# Patient Record
Sex: MALE | Race: WHITE | NOT HISPANIC OR LATINO | Employment: FULL TIME | URBAN - METROPOLITAN AREA
[De-identification: names, ages, dates, MRNs, and addresses within clinical notes are randomized per-mention and may not be internally consistent; named-entity substitution may affect disease eponyms.]

---

## 2022-06-04 ENCOUNTER — IMMUNIZATION (OUTPATIENT)
Dept: VACCINE CLINIC | Facility: HOSPITAL | Age: 47
End: 2022-06-04

## 2022-06-04 DIAGNOSIS — Z23 NEED FOR VACCINATION: Primary | ICD-10-CM

## 2022-06-04 PROCEDURE — 0054A HC ADM SARSCV2 30MCG TRS-SUCR BOOSTER: CPT | Performed by: INTERNAL MEDICINE

## 2022-06-04 PROCEDURE — 91305 HC SARSCOV2 VAC 30 MCG TRS-SUCR PFIZER: CPT | Performed by: INTERNAL MEDICINE

## 2022-09-26 ENCOUNTER — IMMUNIZATION (OUTPATIENT)
Dept: VACCINE CLINIC | Facility: HOSPITAL | Age: 47
End: 2022-09-26

## 2022-09-26 DIAGNOSIS — Z23 NEED FOR VACCINATION: Primary | ICD-10-CM

## 2022-09-26 PROCEDURE — 91312 HC SARSCOV2 VAC 30MCG/0.3ML IM BIVALENT BOOSTER 12 YRS AND OLDER: CPT | Performed by: INTERNAL MEDICINE

## 2022-09-26 PROCEDURE — 0124A: CPT | Performed by: INTERNAL MEDICINE

## 2024-02-19 ENCOUNTER — OFFICE VISIT (OUTPATIENT)
Dept: FAMILY MEDICINE CLINIC | Facility: CLINIC | Age: 49
End: 2024-02-19
Payer: COMMERCIAL

## 2024-02-19 VITALS
OXYGEN SATURATION: 97 % | WEIGHT: 308 LBS | BODY MASS INDEX: 45.62 KG/M2 | TEMPERATURE: 97.6 F | HEART RATE: 98 BPM | SYSTOLIC BLOOD PRESSURE: 142 MMHG | DIASTOLIC BLOOD PRESSURE: 85 MMHG | HEIGHT: 69 IN

## 2024-02-19 DIAGNOSIS — R06.83 SNORING: ICD-10-CM

## 2024-02-19 DIAGNOSIS — B07.8 OTHER VIRAL WARTS: ICD-10-CM

## 2024-02-19 DIAGNOSIS — R12 HEARTBURN: Primary | ICD-10-CM

## 2024-02-19 DIAGNOSIS — Z12.11 ENCOUNTER FOR SCREENING FOR MALIGNANT NEOPLASM OF COLON: ICD-10-CM

## 2024-02-19 DIAGNOSIS — Z23 ENCOUNTER FOR IMMUNIZATION: ICD-10-CM

## 2024-02-19 DIAGNOSIS — Z11.59 NEED FOR HEPATITIS C SCREENING TEST: ICD-10-CM

## 2024-02-19 DIAGNOSIS — R03.0 ELEVATED BLOOD PRESSURE READING: ICD-10-CM

## 2024-02-19 DIAGNOSIS — Z00.00 ANNUAL PHYSICAL EXAM: ICD-10-CM

## 2024-02-19 PROBLEM — E66.01 MORBID (SEVERE) OBESITY DUE TO EXCESS CALORIES: Status: RESOLVED | Noted: 2024-02-19 | Resolved: 2024-02-19

## 2024-02-19 PROCEDURE — 99214 OFFICE O/P EST MOD 30 MIN: CPT | Performed by: FAMILY MEDICINE

## 2024-02-19 PROCEDURE — 99386 PREV VISIT NEW AGE 40-64: CPT | Performed by: FAMILY MEDICINE

## 2024-02-19 PROCEDURE — 90471 IMMUNIZATION ADMIN: CPT | Performed by: FAMILY MEDICINE

## 2024-02-19 PROCEDURE — 90715 TDAP VACCINE 7 YRS/> IM: CPT | Performed by: FAMILY MEDICINE

## 2024-02-19 RX ORDER — LANSOPRAZOLE 30 MG/1
30 CAPSULE, DELAYED RELEASE ORAL EVERY 24 HOURS
Qty: 90 CAPSULE | Refills: 3 | Status: SHIPPED | OUTPATIENT
Start: 2024-02-19

## 2024-02-19 RX ORDER — CHLORAL HYDRATE 500 MG
2000 CAPSULE ORAL DAILY
COMMUNITY

## 2024-02-19 RX ORDER — BIOTIN 10 MG
2 TABLET ORAL DAILY
COMMUNITY

## 2024-02-19 RX ORDER — OSELTAMIVIR PHOSPHATE 75 MG/1
CAPSULE ORAL
COMMUNITY
Start: 2024-01-11 | End: 2024-02-19

## 2024-02-19 RX ORDER — IBUPROFEN 200 MG
600 TABLET ORAL
COMMUNITY

## 2024-02-19 RX ORDER — LANSOPRAZOLE 30 MG/1
CAPSULE, DELAYED RELEASE ORAL EVERY 24 HOURS
COMMUNITY
End: 2024-02-19 | Stop reason: SDUPTHER

## 2024-02-19 NOTE — PATIENT INSTRUCTIONS
Today: Update screening labs and vaccinations.    Meds: refill reflux med    Referrals: sleep medicine- eval sleep apnea  Colonoscopy  You should receive a call within 1 week to schedule the appointment.  If you do not hear anything please let us know.    Imaging: none    Healthy lifestyle tips  - Reduce intake of processed food (shop perimeter of grocery store), especially white flour and sugar  - Increase intake of fruits/veggies  - Drink 32-64oz of water a day  - Quit smoking if you smoke  - Drink no more than 2 alcoholic beverages/day if you are male, no more than 1 alcoholic beverage/day if you are female  - Get 6-8 hours of restful sleep at night- poor sleep quality has been linked to increased risk of cardiovascular disease  - Voluntary physical activity cumulative 120-150 minutes/week  - Stress management utilizing meditation and mindfulness ([employer], free pedro)  - Keep blood pressure < 140/90    Heart healthy diet from AHA: https://www.heart.org/en/healthy-living/healthy-eating/eat-smart/nutrition-basics/aha-diet-and-lifestyle-recommendations

## 2024-02-19 NOTE — ASSESSMENT & PLAN NOTE
Class 3 Severe Obesity (BMI >=40). Obesity-related health conditions include the following: GERD, elevated BP, poss DANIELA. Obesity is unchanged. BMI is is above average; BMI management plan is completed. We discussed portion control and increasing exercise. Labs pending, ref to sleep med for DANIELA eval. Will revisit at followup if no improvement, may discuss obesity meds vs bariatric surgery eval.

## 2024-02-19 NOTE — ASSESSMENT & PLAN NOTE
Patient to make appt with PCP for shaving and cryo; if no improvement after 3-4 tx, will ref to derm for higher level of care,

## 2024-02-19 NOTE — PROGRESS NOTES
"Chief Complaint  Chief Complaint   Patient presents with    Establish Care    Heartburn    Rash    Snoring       Subjective    History of Present Illness  Valente Mccall is a 48 y.o. male presents to [facility] to establish care.  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   Diet: \"Could be better\"  Physical activity: Walk 5 days a week usually, on average a mile  Support system: Wife, kids, parents, siblings, friends, coworkers  Sleep: He wishes he got more, but seems to sleep great when he does sleep. His wife says he does snore.   Mood: Good, no concerns  Health goals: Hasn't been to a PCP in a long time, wants to start on preventative health and health maintenance.  He has a wart on his left forearm and left that he has tried to get rid of at home but has been unable to.   He gets heartburn more frequently and uses Prevacid. No difficulty swallowing, no pain with eating, early satiety, no dark/tarry stool.     Current Outpatient Medications on File Prior to Visit   Medication Sig Dispense Refill    ibuprofen (ADVIL,MOTRIN) 200 MG tablet Take 3 tablets by mouth.      Multiple Vitamins-Minerals (Multivitamin Adult) chewable tablet Chew 2 each Daily.      [DISCONTINUED] lansoprazole (Prevacid) 30 MG capsule Daily.      [DISCONTINUED] oseltamivir (TAMIFLU) 75 MG capsule       [DISCONTINUED] PSEUDOEPHEDRINE HCL ER PO Take 1 tablet by mouth Every 12 (Twelve) Hours.      Omega-3 Fatty Acids (fish oil) 1000 MG capsule capsule Take 2 capsules by mouth Daily. (Patient not taking: Reported on 2/19/2024)       No current facility-administered medications on file prior to visit.       Patient Active Problem List   Diagnosis    Body mass index (BMI) 40.0-44.9, adult    Heartburn    Other viral warts    Elevated blood pressure reading    Snoring "       Past Medical History:   Diagnosis Date    Prostatitis 2011    Hospitalized 2 night, IV levaquin       Past Surgical History:   Procedure Laterality Date    ADENOIDECTOMY  1982    TONSILLECTOMY  1982       Family History   Problem Relation Age of Onset    Multiple sclerosis Mother     Hyperlipidemia Brother     Hypertension Brother     Alcohol abuse Maternal Grandfather     Alcohol abuse Paternal Grandfather     Diabetes type I Cousin     Colon cancer Neg Hx     Breast cancer Neg Hx        Health Maintenance Summary            Ordered - COLORECTAL CANCER SCREENING (COLONOSCOPY - Every 10 Years) Ordered on 2/19/2024      No completion, postpone, or frequency change history exists for this topic.              Ordered - HEPATITIS C SCREENING (Once) Ordered on 2/19/2024      No completion, postpone, or frequency change history exists for this topic.              ANNUAL PHYSICAL (Yearly) Next due on 2/19/2025 02/19/2024  Done              BMI FOLLOWUP (Yearly) Next due on 2/19/2025 02/19/2024  Postponed until 2/20/2024 by Chelsie Wright MD (Pending event)    02/19/2024  Smart[employer]: WORKFLOW - QUALITY MEASUREMENT - DOCUMENTED WEIGHT FOLLOW-UP PLAN    02/19/2024  SmartData: WORKFLOW - QUALITY MEASUREMENT - BMI FOLLOW UP CARE PLAN DOCUMENTED              TDAP/TD VACCINES (2 - Td or Tdap) Next due on 2/19/2034 02/19/2024  Imm Admin: Tdap              COVID-19 Vaccine (Series Information) Completed      10/21/2023  Imm Admin: COVID-19 F23 (PFIZER) 12YRS+ (COMIRNATY)    09/26/2022  Imm Admin: COVID-19 (PFIZER) BIVALENT 12+YRS    06/04/2022  Imm Admin: Covid-19 (Pfizer) Gray Cap Monovalent    09/25/2021  Imm Admin: COVID-19 (PFIZER) Purple Cap Monovalent    02/12/2021  Imm Admin: COVID-19 (PFIZER) Purple Cap Monovalent    Only the first 5 history entries have been loaded, but more history exists.              INFLUENZA VACCINE  Completed      10/21/2023  Imm Admin: Influenza Injectable Mdck Pf Quad     "10/14/2022  Imm Admin: Fluzone (or Fluarix & Flulaval for VFC) >6mos    12/05/2021  Imm Admin: Flu Vaccine Quad PF >36MO    12/05/2021  Imm Admin: Fluzone (or Fluarix & Flulaval for VFC) >6mos    10/30/2020  Imm Admin: Flu Vaccine Quad PF >36MO    Only the first 5 history entries have been loaded, but more history exists.              Pneumococcal Vaccine 0-64 (Series Information) Aged Out      No completion, postpone, or frequency change history exists for this topic.                       Objective   Vitals:    02/19/24 0806   BP: 142/85   Pulse: 98   Temp: 97.6 °F (36.4 °C)   SpO2: 97%   Weight: (!) 140 kg (308 lb)   Height: 175.3 cm (69\")        Physical Exam  Vitals reviewed.   Constitutional:       General: He is not in acute distress.     Appearance: Normal appearance.   HENT:      Head: Normocephalic and atraumatic.      Right Ear: Tympanic membrane, ear canal and external ear normal.      Left Ear: Tympanic membrane, ear canal and external ear normal.      Nose: Nose normal. No rhinorrhea.      Mouth/Throat:      Mouth: Mucous membranes are moist.      Pharynx: No posterior oropharyngeal erythema.      Comments: Mallampati 3  Eyes:      Extraocular Movements: Extraocular movements intact.      Conjunctiva/sclera: Conjunctivae normal.      Pupils: Pupils are equal, round, and reactive to light.   Neck:      Comments: No thyromegaly or thyroid nodule on palpation  Cardiovascular:      Rate and Rhythm: Normal rate and regular rhythm.      Pulses: Normal pulses.      Heart sounds: Normal heart sounds. No murmur heard.  Pulmonary:      Effort: Pulmonary effort is normal.      Breath sounds: Normal breath sounds. No wheezing.   Abdominal:      General: Bowel sounds are normal. There is no distension.      Palpations: Abdomen is soft.      Tenderness: There is no abdominal tenderness.   Musculoskeletal:         General: No tenderness or deformity. Normal range of motion.      Cervical back: Normal range of motion " and neck supple.   Lymphadenopathy:      Cervical: No cervical adenopathy.   Skin:     General: Skin is warm and dry.      Capillary Refill: Capillary refill takes less than 2 seconds.      Findings: Lesion (nontender verrucous leion lateral L forearm, tip of R thumb) present. No rash.   Neurological:      General: No focal deficit present.      Mental Status: He is alert and oriented to person, place, and time.      Gait: Gait normal.      Deep Tendon Reflexes: Reflexes normal.   Psychiatric:         Mood and Affect: Mood normal.         Behavior: Behavior normal.         Judgment: Judgment normal.          PHQ-9 Depression Screening  Little interest or pleasure in doing things? 0-->not at all   Feeling down, depressed, or hopeless? 0-->not at all   Trouble falling or staying asleep, or sleeping too much?     Feeling tired or having little energy?     Poor appetite or overeating?     Feeling bad about yourself - or that you are a failure or have let yourself or your family down?     Trouble concentrating on things, such as reading the newspaper or watching television?     Moving or speaking so slowly that other people could have noticed? Or the opposite - being so fidgety or restless that you have been moving around a lot more than usual?     Thoughts that you would be better off dead, or of hurting yourself in some way?     PHQ-9 Total Score 0   If you checked off any problems, how difficult have these problems made it for you to do your work, take care of things at home, or get along with other people?       STOP-BANG Score for Obstructive Sleep Apnea from [employer]  on 2/19/2024  ** All calculations should be rechecked by clinician prior to use **    RESULT SUMMARY:  5 points  STOP-BANG    High   Risk for moderate to severe DANIELA      INPUTS:  Do you snore loudly? --> 1 = Yes  Do you often feel tired, fatigued, or sleepy during the daytime? --> 0 = No  Has anyone observed you stop breathing during sleep? --> 0 =  No  Do you have (or are you being treated for) high blood pressure? --> 1 = Yes  BMI --> 1 = >35 kg/m²  Age --> 0 = ?50 years  Neck circumference --> 1 = >40 cm  Gender --> 1 = Male      Assessment and Plan  Valente Mccall is a 48 y.o. male presents to [facility] today to establish care, chart reviewed and updated, medications reviewed, labs reviewed, preventative med reviewed. Patient has not been seen by primary care for annual exam in the last 12 months.. Answered all questions at this time, pt expressed no further concerns today.     Preventative medicine:   Eye exam: Up to date.    Dental exam: Up to date.    BP: abnormal- slightly elevated  Lipid Panel :   ordered    A1c:  ordered    Hep C screening: ordered  HIV Screen UTD - N/A  STI screening UTD: N/A  Colon cancer screening UTD: ordered colonoscopy  Lung cancer screening UTD: N/A  Immunizations UTD: Yes  Anxiety/depression screening: normal  Tobacco cessation counseling: N/A    Men:   Aortic aneurysm screen UTD: N/A  PSA UTD:  N/A      Diagnoses and all orders for this visit:    1. Heartburn (Primary)  Assessment & Plan:   likely GERD by history. Exam reassuring, no red flag symptoms.   - refill PPI at this time, will revisit at followup  - If trial of PPI fails, will possibly need to perform an EGD for evaluation and definitive diagnosis.   - RTC if epigastric pain does not improve or worsens during PPI therapy, at that time will dc PPI and get H pylori stool antigen test    Orders:  -     lansoprazole (Prevacid) 30 MG capsule; Take 1 capsule by mouth Daily. Indications: Heartburn  Dispense: 90 capsule; Refill: 3    2. Snoring  Assessment & Plan:  Patient with snoring, Mallampati 3 on exam, high risk STOP BANG.   - ref to sleep med for DANIELA eval    Orders:  -     Ambulatory Referral to Sleep Medicine    3. Elevated blood pressure reading  Assessment & Plan:   with elevated BP x 2, prior hx of elevated BP. Not currently on  anti-HTN medications. Goal blood pressure is less than 140/90.   - pt advised to get automatic arm cuff and checking blood pressure every 2-3 days at home. Counseled to place the cuff on bare skin, rest arm at the level of the heart, keep legs uncrossed and feet flat on the ground. Do not talk while blood pressure is being checked.   - provided info on DASH diet and Mediterranean diet, encouraged tobacco avoidance  - If BP consistently >140/90 will need HTN med initiation      4. Other viral warts  Assessment & Plan:  Patient to make appt with PCP for shaving and cryo; if no improvement after 3-4 tx, will ref to derm for higher level of care,       5. Body mass index (BMI) 40.0-44.9, adult  Assessment & Plan:  Class 3 Severe Obesity (BMI >=40). Obesity-related health conditions include the following: GERD, elevated BP, poss DANIELA. Obesity is unchanged. BMI is is above average; BMI management plan is completed. We discussed portion control and increasing exercise. Labs pending, ref to sleep med for DANIELA eval. Will revisit at followup if no improvement, may discuss obesity meds vs bariatric surgery eval.       Orders:  -     CBC & Differential  -     Comprehensive Metabolic Panel  -     Lipid Panel  -     TSH Rfx On Abnormal To Free T4  -     Vitamin D,25-Hydroxy  -     Hemoglobin A1c  -     Ambulatory Referral to Sleep Medicine    6. Encounter for screening for malignant neoplasm of colon  -     Ambulatory Referral For Screening Colonoscopy    7. Need for hepatitis C screening test  -     Hepatitis C Antibody    8. Encounter for immunization  -     Tdap Vaccine Greater Than or Equal To 8yo IM    9. Annual physical exam        Patient voiced understanding and agreement with plan of care and had no further questions or concerns at this time.     Chelsie Wright MD  Family Medicine  [facility]    Follow Up  Return wart removal- needs proc appt, thank you!.    Patient Instructions   Today: Update screening  labs and vaccinations.    Meds: refill reflux med    Referrals: sleep medicine- eval sleep apnea  Colonoscopy  You should receive a call within 1 week to schedule the appointment.  If you do not hear anything please let us know.    Imaging: none    Healthy lifestyle tips  - Reduce intake of processed food (shop perimeter of grocery store), especially white flour and sugar  - Increase intake of fruits/veggies  - Drink 32-64oz of water a day  - Quit smoking if you smoke  - Drink no more than 2 alcoholic beverages/day if you are male, no more than 1 alcoholic beverage/day if you are female  - Get 6-8 hours of restful sleep at night- poor sleep quality has been linked to increased risk of cardiovascular disease  - Voluntary physical activity cumulative 120-150 minutes/week  - Stress management utilizing meditation and mindfulness ([employer]imer, free pedro)  - Keep blood pressure < 140/90    Heart healthy diet from AHA: https://www.heart.org/en/healthy-living/healthy-eating/eat-smart/nutrition-basics/aha-diet-and-lifestyle-recommendations

## 2024-02-19 NOTE — ASSESSMENT & PLAN NOTE
Patient with snoring, Mallampati 3 on exam, high risk STOP BANG.   - ref to sleep med for DANIELA eval

## 2024-02-19 NOTE — ASSESSMENT & PLAN NOTE
likely GERD by history. Exam reassuring, no red flag symptoms.   - refill PPI at this time, will revisit at followup  - If trial of PPI fails, will possibly need to perform an EGD for evaluation and definitive diagnosis.   - RTC if epigastric pain does not improve or worsens during PPI therapy, at that time will dc PPI and get H pylori stool antigen test

## 2024-02-19 NOTE — ASSESSMENT & PLAN NOTE
with elevated BP x 2, prior hx of elevated BP. Not currently on anti-HTN medications. Goal blood pressure is less than 140/90.   - pt advised to get automatic arm cuff and checking blood pressure every 2-3 days at home. Counseled to place the cuff on bare skin, rest arm at the level of the heart, keep legs uncrossed and feet flat on the ground. Do not talk while blood pressure is being checked.   - provided info on DASH diet and Mediterranean diet, encouraged tobacco avoidance  - If BP consistently >140/90 will need HTN med initiation

## 2024-02-20 ENCOUNTER — PATIENT ROUNDING (BHMG ONLY) (OUTPATIENT)
Dept: FAMILY MEDICINE CLINIC | Facility: CLINIC | Age: 49
End: 2024-02-20
Payer: COMMERCIAL

## 2024-02-20 LAB
25(OH)D3+25(OH)D2 SERPL-MCNC: 12.1 NG/ML (ref 30–100)
ALBUMIN SERPL-MCNC: 4.9 G/DL (ref 3.5–5.2)
ALBUMIN/GLOB SERPL: 2.1 G/DL
ALP SERPL-CCNC: 80 U/L (ref 39–117)
ALT SERPL-CCNC: 45 U/L (ref 1–41)
AST SERPL-CCNC: 29 U/L (ref 1–40)
BASOPHILS # BLD AUTO: 0.04 10*3/MM3 (ref 0–0.2)
BASOPHILS NFR BLD AUTO: 0.7 % (ref 0–1.5)
BILIRUB SERPL-MCNC: 0.6 MG/DL (ref 0–1.2)
BUN SERPL-MCNC: 18 MG/DL (ref 6–20)
BUN/CREAT SERPL: 18.2 (ref 7–25)
CALCIUM SERPL-MCNC: 9.5 MG/DL (ref 8.6–10.5)
CHLORIDE SERPL-SCNC: 101 MMOL/L (ref 98–107)
CHOLEST SERPL-MCNC: 158 MG/DL (ref 0–200)
CO2 SERPL-SCNC: 25.5 MMOL/L (ref 22–29)
CREAT SERPL-MCNC: 0.99 MG/DL (ref 0.76–1.27)
EGFRCR SERPLBLD CKD-EPI 2021: 95.1 ML/MIN/1.73
EOSINOPHIL # BLD AUTO: 0.1 10*3/MM3 (ref 0–0.4)
EOSINOPHIL NFR BLD AUTO: 1.7 % (ref 0.3–6.2)
ERYTHROCYTE [DISTWIDTH] IN BLOOD BY AUTOMATED COUNT: 13.2 % (ref 12.3–15.4)
GLOBULIN SER CALC-MCNC: 2.3 GM/DL
GLUCOSE SERPL-MCNC: 97 MG/DL (ref 65–99)
HBA1C MFR BLD: 5.2 % (ref 4.8–5.6)
HCT VFR BLD AUTO: 44.1 % (ref 37.5–51)
HCV IGG SERPL QL IA: NON REACTIVE
HDLC SERPL-MCNC: 61 MG/DL (ref 40–60)
HGB BLD-MCNC: 15 G/DL (ref 13–17.7)
IMM GRANULOCYTES # BLD AUTO: 0.03 10*3/MM3 (ref 0–0.05)
IMM GRANULOCYTES NFR BLD AUTO: 0.5 % (ref 0–0.5)
LDLC SERPL CALC-MCNC: 84 MG/DL (ref 0–100)
LYMPHOCYTES # BLD AUTO: 1.94 10*3/MM3 (ref 0.7–3.1)
LYMPHOCYTES NFR BLD AUTO: 32.1 % (ref 19.6–45.3)
MCH RBC QN AUTO: 28.6 PG (ref 26.6–33)
MCHC RBC AUTO-ENTMCNC: 34 G/DL (ref 31.5–35.7)
MCV RBC AUTO: 84.2 FL (ref 79–97)
MONOCYTES # BLD AUTO: 0.49 10*3/MM3 (ref 0.1–0.9)
MONOCYTES NFR BLD AUTO: 8.1 % (ref 5–12)
NEUTROPHILS # BLD AUTO: 3.44 10*3/MM3 (ref 1.7–7)
NEUTROPHILS NFR BLD AUTO: 56.9 % (ref 42.7–76)
NRBC BLD AUTO-RTO: 0 /100 WBC (ref 0–0.2)
PLATELET # BLD AUTO: 265 10*3/MM3 (ref 140–450)
POTASSIUM SERPL-SCNC: 5.2 MMOL/L (ref 3.5–5.2)
PROT SERPL-MCNC: 7.2 G/DL (ref 6–8.5)
RBC # BLD AUTO: 5.24 10*6/MM3 (ref 4.14–5.8)
SODIUM SERPL-SCNC: 142 MMOL/L (ref 136–145)
TRIGL SERPL-MCNC: 68 MG/DL (ref 0–150)
TSH SERPL DL<=0.005 MIU/L-ACNC: 1.91 UIU/ML (ref 0.27–4.2)
VLDLC SERPL CALC-MCNC: 13 MG/DL (ref 5–40)
WBC # BLD AUTO: 6.04 10*3/MM3 (ref 3.4–10.8)

## 2024-02-20 NOTE — PROGRESS NOTES
HI Mr. Mccall    My name is Dipti. I'd like to welcome you to our practice. I  Hope your recent visit to our office went smoothly.    If you have questions or concerns, you may reach me at [phone].     Thank you and Have a great day!

## 2024-02-20 NOTE — PROGRESS NOTES
Hello!    Here are the results of your most recent labs:    Your hep C antibody, CBC, Cholesterol, Hgb A1C, Kidney Profile, and TSH was all normal.     Your vitamin D and liver enzymes was abnormal.     I recommend supplementing with OTC vitamin D 1000 IU daily for 3-6 months, then take 400 IU daily or a multivitamin with vitamin D in it.     One of your liver enzymes, ALT, was slightly elevated at 45 (goal is less than 41). Because your cholesterol and diabetes labs look ok and your Hepatitis lab was negative, I think we should just watch this one and repeat in 3-6 months to ensure it goes back to normal.       Please continue your current medications.  Please contact me with any questions.    Thank you!  Dr. Wright

## 2024-03-14 ENCOUNTER — PROCEDURE VISIT (OUTPATIENT)
Dept: FAMILY MEDICINE CLINIC | Facility: CLINIC | Age: 49
End: 2024-03-14
Payer: COMMERCIAL

## 2024-03-14 VITALS
DIASTOLIC BLOOD PRESSURE: 72 MMHG | WEIGHT: 306 LBS | HEART RATE: 74 BPM | SYSTOLIC BLOOD PRESSURE: 136 MMHG | OXYGEN SATURATION: 99 % | TEMPERATURE: 98.2 F | BODY MASS INDEX: 45.32 KG/M2 | HEIGHT: 69 IN

## 2024-03-14 DIAGNOSIS — R03.0 ELEVATED BLOOD PRESSURE READING: ICD-10-CM

## 2024-03-14 DIAGNOSIS — B07.8 OTHER VIRAL WARTS: Primary | ICD-10-CM

## 2024-03-14 DIAGNOSIS — R12 HEARTBURN: ICD-10-CM

## 2024-03-14 DIAGNOSIS — L81.9 PIGMENTED SKIN LESION OF UNCERTAIN BEHAVIOR OF UPPER EXTREMITY: ICD-10-CM

## 2024-03-14 RX ORDER — IMIQUIMOD 12.5 MG/.25G
1 CREAM TOPICAL 3 TIMES WEEKLY
Qty: 24 EACH | Refills: 0 | Status: SHIPPED | OUTPATIENT
Start: 2024-03-15

## 2024-03-14 NOTE — PATIENT INSTRUCTIONS
Today: Wart freezing R thumb, shave biopsy L arm    Meds: Imiquimod 3x weekly to thumb wart before bed, rinse off in AM    Post-biopsy instructions:  - Keep biopsy site covered the rest of today  - tomorrow before a shower remove bandage, if no bleeding ok to gently rinse with warm soapy water in shower  - After shower pat area dry, then apply antibiotic ointment or petroleum jelly then sterile bandage  - Daily bandage changes for 4-7 days until scab forms  - Once scab forms, recommend keep applying antibiotic ointment or petroleum jelly to help with wound healing, may cover with bandage if desired  - Will notify of pathology results

## 2024-03-14 NOTE — PROGRESS NOTES
"Chief Complaint  Chief Complaint   Patient presents with    Procedure     Wart removal, arm/right finger/left foot    Heartburn    Hypertension       Subjective    History of Present Illness  Valente Mccall is a 48 y.o. male presents to [facility] for followup of lesion on R thumb (present x years, has tried to remove previously), L posterior forearm (present x year, was scaly at last appt but screctehed scales off), and L sole of foot x 2 (present x year, previously painful but not bothering him today)    Objective   Vitals:    03/14/24 0812   BP: 136/72   Pulse: 74   Temp: 98.2 °F (36.8 °C)   SpO2: 99%   Weight: (!) 139 kg (306 lb)   Height: 175.3 cm (69.02\")                Physical Exam  Vitals reviewed.   Constitutional:       Appearance: Normal appearance.   HENT:      Head: Normocephalic and atraumatic.   Cardiovascular:      Comments: Well perfused  Pulmonary:      Effort: Pulmonary effort is normal. No respiratory distress.   Abdominal:      General: There is no distension.   Musculoskeletal:         General: Normal range of motion.   Skin:     Comments: Hyperkeratotic lesion ball of R thumb, smooth slightly pigmented 0.5cm papule posterior L forearm without scale   Neurological:      Mental Status: He is alert. Mental status is at baseline.       Cryotherapy, Skin Lesion    Date/Time: 3/14/2024 8:54 AM    Performed by: Chelsie Wright MD  Authorized by: Chelsie Wright MD  Preparation: Patient was prepped and draped in the usual sterile fashion.  Local anesthesia used: no    Anesthesia:  Local anesthesia used: no    Sedation:  Patient sedated: no    Patient tolerance: patient tolerated the procedure well with no immediate complications  Comments: The patient was appropriately consented.  The patient was placed in the seated position on the exam table.  The wart were wiped with alcohol wipe x 1 . Lesion treated with cryotherapy using freeze thaw technique x 3.  EBL 0mL, patient " tolerated procedure well.  Aftercare instructions provided.        Biopsy    Date/Time: 3/14/2024 8:54 AM    Performed by: Chelsie Wright MD  Authorized by: Chelsie Wright MD    Procedure Details - Skin Biopsy:     Body area: upper extremity    Upper extremity location: L lower arm    Initial size (mm): 5    Final defect size (mm): 10    Malignancy: malignancy unknown      Destruction method: shave biopsy      Comments:   The patient was consented. A verbal timeout was taken confirming patient name//procedure/laterality.     The patient was placed in the seated position on the exam table. The lesion was identified and cleaned with alcohol x 3. Local anesthetic was administered using a 25 gauge needle x  1 mL of 1% lidocaine with epinephrine. The lesion was removed using the shave biopsy technique. The specimen was placed in a formalin container and sent for pathology. Hemostasis was achieved using pressure. EBL 0.1 mL. Sterile antibiotic ointment and a sterile bandage was applied to the biopsy site.     The patient tolerated the procedure well. The patient was given biopsy site aftercare instructions and supplies.          Assessment and Plan  Valente Mccall is a 48 y.o. male presents to [facility] today for wart cryotherapy and shave biopsy. The lesions on the foot were left alone as they are not bothering him at this time.     Diagnoses and all orders for this visit:    1. Other viral warts (Primary)  Comments:  Cryo today. Topical imiquimod 3x weekly before bed, rinse in AM. May repeat cryo in 4 weeks.  Orders:  -     imiquimod (Aldara) 5 % cream; Apply 1 Application topically to the appropriate area as directed 3 (Three) Times a Week. Indications: Flat Wart  Dispense: 24 each; Refill: 0  -     Cryotherapy, Skin Lesion    2. Pigmented skin lesion of uncertain behavior of upper extremity  Comments:  Shave biopsy per proc note. Lesion sent for pathology. Will f/u as  needed.  Orders:  -     Tissue Pathology Exam  -     Biopsy    3. Elevated blood pressure reading  Comments:  Prev hx of elevated BP; normotensive today. Not on anti-HTN meds. Will cont to monitor.    4. Heartburn  Comments:  Improved with prevacid and avoiding trigger foods. Will cont to monitor.        Patient voiced understanding and agreement with plan of care and had no further questions or concerns at this time.     I spent 30 minutes caring for Valente Mccall on this date of service. This time includes time spent by me in the following activities: preparing for the visit, reviewing tests, obtaining and/or reviewing a separately obtained history, performing a medically appropriate examination and/or evaluation, counseling and educating the patient/family/caregiver, ordering medications, tests, or procedures, documenting information in the medical record, and independently interpreting results and communicating that information with the patient/family/caregiver. I spent 10 minutes on the separately reported service of wart cryotherapy and shave biopsy. This time is not included in the time used to support the E/M service also reported today.       Chelsie Wright MD  Family Medicine  [facility]      Follow Up  Return in about 4 weeks (around 4/11/2024) for repeat wart freezing.    Patient Instructions   Today: Wart freezing R thumb, shave biopsy L arm    Meds: Imiquimod 3x weekly to thumb wart before bed, rinse off in AM    Post-biopsy instructions:  - Keep biopsy site covered the rest of today  - tomorrow before a shower remove bandage, if no bleeding ok to gently rinse with warm soapy water in shower  - After shower pat area dry, then apply antibiotic ointment or petroleum jelly then sterile bandage  - Daily bandage changes for 4-7 days until scab forms  - Once scab forms, recommend keep applying antibiotic ointment or petroleum jelly to help with wound healing, may cover with bandage if  desired  - Will notify of pathology results

## 2024-03-19 LAB
DX ICD CODE: NORMAL
PATH REPORT.FINAL DX SPEC: NORMAL
PATH REPORT.GROSS SPEC: NORMAL
PATH REPORT.RELEVANT HX SPEC: NORMAL
PATH REPORT.SITE OF ORIGIN SPEC: NORMAL
PATHOLOGIST NAME: NORMAL
PAYMENT PROCEDURE: NORMAL

## 2024-03-21 DIAGNOSIS — Z12.11 COLON CANCER SCREENING: Primary | ICD-10-CM

## 2024-04-18 ENCOUNTER — OFFICE VISIT (OUTPATIENT)
Dept: FAMILY MEDICINE CLINIC | Facility: CLINIC | Age: 49
End: 2024-04-18
Payer: COMMERCIAL

## 2024-04-18 VITALS
BODY MASS INDEX: 45.91 KG/M2 | DIASTOLIC BLOOD PRESSURE: 97 MMHG | HEART RATE: 80 BPM | WEIGHT: 310 LBS | OXYGEN SATURATION: 100 % | HEIGHT: 69 IN | SYSTOLIC BLOOD PRESSURE: 138 MMHG

## 2024-04-18 DIAGNOSIS — R03.0 ELEVATED BLOOD PRESSURE READING: ICD-10-CM

## 2024-04-18 DIAGNOSIS — B07.8 OTHER VIRAL WARTS: Primary | ICD-10-CM

## 2024-04-18 PROCEDURE — 99213 OFFICE O/P EST LOW 20 MIN: CPT | Performed by: FAMILY MEDICINE

## 2024-04-18 NOTE — PROGRESS NOTES
"Chief Complaint  Chief Complaint   Patient presents with    Procedure     Right thumb       Subjective    History of Present Illness  Valente Mccall is a 48 y.o. male presents to [facility] for followup of repeat wart cryotherapy R thumb.  The wart has improved after cryotherapy and imiquimod treatment.  The shave biopsy site is healing well.    BP elevated in clinic today. Home -130s/80s.  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    Objective   Vitals:    04/18/24 1407   BP: 138/97   Pulse: 80   SpO2: 100%   Weight: (!) 141 kg (310 lb)   Height: 175.3 cm (69.02\")      Physical Exam  Vitals reviewed.   Constitutional:       Appearance: Normal appearance.   HENT:      Head: Normocephalic and atraumatic.   Cardiovascular:      Comments: Well perfused  Pulmonary:      Effort: Pulmonary effort is normal. No respiratory distress.   Abdominal:      General: There is no distension.   Musculoskeletal:         General: Normal range of motion.   Neurological:      Mental Status: He is alert. Mental status is at baseline.        Cryotherapy, Skin Lesion    Date/Time: 4/18/2024 4:48 PM    Performed by: Chelsie Wright MD  Authorized by: Chelsie Wright MD  Preparation: Patient was prepped and draped in the usual sterile fashion.  Local anesthesia used: no    Anesthesia:  Local anesthesia used: no    Sedation:  Patient sedated: no    Patient tolerance: patient tolerated the procedure well with no immediate complications  Comments: The patient was appropriately consented.  The patient was placed in the seated position on the exam table.  The wart was wiped with alcohol wipe x 1.  Lesion treated with cryotherapy using freeze thaw technique x 3.  EBL 0 mL, patient tolerated procedure well.  Aftercare instructions provided.              The following data was reviewed by: " Chelsie Wright MD on 04/18/2024:  CMP          2/19/2024    09:05   CMP   Glucose 97    BUN 18    Creatinine 0.99    Sodium 142    Potassium 5.2    Chloride 101    Calcium 9.5    Total Protein 7.2    Albumin 4.9    Globulin 2.3    Total Bilirubin 0.6    Alkaline Phosphatase 80    AST (SGOT) 29    ALT (SGPT) 45    BUN/Creatinine Ratio 18.2      CBC          2/19/2024    09:05   CBC   WBC 6.04    RBC 5.24    Hemoglobin 15.0    Hematocrit 44.1    MCV 84.2    MCH 28.6    MCHC 34.0    RDW 13.2    Platelets 265      Lipid Panel          2/19/2024    09:05   Lipid Panel   Total Cholesterol 158    Triglycerides 68    HDL Cholesterol 61    VLDL Cholesterol 13    LDL Cholesterol  84      TSH          2/19/2024    09:05   TSH   TSH 1.910      Most Recent A1C          2/19/2024    09:05   HGBA1C Most Recent   Hemoglobin A1C 5.20    Reviewed pathology report shave biopsy  Last 2 clinic notes      Assessment and Plan  Valente Mccall is a 48 y.o. male presents to [facility] today for followup of wart cryotherapy, elevated blood pressure in clinic.      Diagnoses and all orders for this visit:    1. Other viral warts (Primary)  Comments:  Procedure per procedure note.  Patient may RTC in 8 weeks for repeat procedure.  Continue imiquimod 2 times weekly.  Orders:  -     Cryotherapy, Skin Lesion    2. Elevated blood pressure reading  Overview:  with elevated BP x 2, prior hx of elevated BP.  Home blood pressure normotensive.  Not currently on anti-HTN medications. Goal blood pressure is less than 140/90.   - pt advised to get automatic arm cuff and checking blood pressure every 2-3 days at home. Counseled to place the cuff on bare skin, rest arm at the level of the heart, keep legs uncrossed and feet flat on the ground. Do not talk while blood pressure is being checked.   - provided info on DASH diet and Mediterranean diet, encouraged tobacco avoidance  - If BP consistently >140/90 will need HTN med  initiation          Patient voiced understanding and agreement with plan of care and had no further questions or concerns at this time.      Problems addressed: 2 or more stable chronic illnesses (MODERATE) established problem: stable, procedure  Complexity: labs reviewed yes    Chelsie Wright MD  [facility]      Follow Up  Return in 8 weeks (on 6/13/2024), or if symptoms worsen or fail to improve, for Repeat wart. 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    Patient Instructions   Today: Repeat cryotherapy wart. Cont imiquimod cream 2x weekly.

## 2024-06-12 ENCOUNTER — TELEPHONE (OUTPATIENT)
Dept: FAMILY MEDICINE CLINIC | Facility: CLINIC | Age: 49
End: 2024-06-12
Payer: COMMERCIAL

## 2024-06-12 NOTE — TELEPHONE ENCOUNTER
Pt aware Lansoprazole not covered by plan, pt actually was able to get med through [employer] cheaper than with insurance covering so he has med

## 2024-07-18 PROBLEM — Z12.11 COLON CANCER SCREENING: Status: ACTIVE | Noted: 2024-03-21

## 2024-08-19 ENCOUNTER — ANESTHESIA (OUTPATIENT)
Dept: GASTROENTEROLOGY | Facility: HOSPITAL | Age: 49
End: 2024-08-19
Payer: COMMERCIAL

## 2024-08-19 ENCOUNTER — HOSPITAL ENCOUNTER (OUTPATIENT)
Facility: HOSPITAL | Age: 49
Setting detail: HOSPITAL OUTPATIENT SURGERY
Discharge: HOME | End: 2024-08-19
Attending: SURGERY | Admitting: SURGERY
Payer: COMMERCIAL

## 2024-08-19 ENCOUNTER — ANESTHESIA EVENT (OUTPATIENT)
Dept: GASTROENTEROLOGY | Facility: HOSPITAL | Age: 49
End: 2024-08-19
Payer: COMMERCIAL

## 2024-08-19 VITALS
BODY MASS INDEX: 46.65 KG/M2 | WEIGHT: 315 LBS | HEART RATE: 83 BPM | OXYGEN SATURATION: 97 % | DIASTOLIC BLOOD PRESSURE: 85 MMHG | HEIGHT: 69 IN | RESPIRATION RATE: 16 BRPM | SYSTOLIC BLOOD PRESSURE: 146 MMHG

## 2024-08-19 DIAGNOSIS — Z12.11 COLON CANCER SCREENING: ICD-10-CM

## 2024-08-19 PROCEDURE — S0260 H&P FOR SURGERY: HCPCS | Performed by: SURGERY

## 2024-08-19 PROCEDURE — 45378 DIAGNOSTIC COLONOSCOPY: CPT | Performed by: SURGERY

## 2024-08-19 PROCEDURE — 25810000003 LACTATED RINGERS PER 1000 ML: Performed by: SURGERY

## 2024-08-19 PROCEDURE — 25010000002 PROPOFOL 1000 MG/100ML EMULSION: Performed by: NURSE ANESTHETIST, CERTIFIED REGISTERED

## 2024-08-19 PROCEDURE — 25010000002 PROPOFOL 200 MG/20ML EMULSION: Performed by: NURSE ANESTHETIST, CERTIFIED REGISTERED

## 2024-08-19 RX ORDER — LIDOCAINE HYDROCHLORIDE 20 MG/ML
INJECTION, SOLUTION INFILTRATION; PERINEURAL AS NEEDED
Status: DISCONTINUED | OUTPATIENT
Start: 2024-08-19 | End: 2024-08-19 | Stop reason: SURG

## 2024-08-19 RX ORDER — SODIUM CHLORIDE, SODIUM LACTATE, POTASSIUM CHLORIDE, CALCIUM CHLORIDE 600; 310; 30; 20 MG/100ML; MG/100ML; MG/100ML; MG/100ML
1000 INJECTION, SOLUTION INTRAVENOUS CONTINUOUS
Status: DISCONTINUED | OUTPATIENT
Start: 2024-08-19 | End: 2024-08-19 | Stop reason: HOSPADM

## 2024-08-19 RX ORDER — PROPOFOL 10 MG/ML
INJECTION, EMULSION INTRAVENOUS AS NEEDED
Status: DISCONTINUED | OUTPATIENT
Start: 2024-08-19 | End: 2024-08-19 | Stop reason: SURG

## 2024-08-19 RX ORDER — PROPOFOL 10 MG/ML
INJECTION, EMULSION INTRAVENOUS CONTINUOUS PRN
Status: DISCONTINUED | OUTPATIENT
Start: 2024-08-19 | End: 2024-08-19 | Stop reason: SURG

## 2024-08-19 RX ADMIN — SODIUM CHLORIDE, POTASSIUM CHLORIDE, SODIUM LACTATE AND CALCIUM CHLORIDE 1000 ML: 600; 310; 30; 20 INJECTION, SOLUTION INTRAVENOUS at 08:43

## 2024-08-19 RX ADMIN — PROPOFOL INJECTABLE EMULSION 120 MG: 10 INJECTION, EMULSION INTRAVENOUS at 09:18

## 2024-08-19 RX ADMIN — PROPOFOL 140 MCG/KG/MIN: 10 INJECTION, EMULSION INTRAVENOUS at 09:18

## 2024-08-19 RX ADMIN — LIDOCAINE HYDROCHLORIDE 3 ML: 20 INJECTION, SOLUTION INFILTRATION; PERINEURAL at 09:18

## 2024-08-19 NOTE — OP NOTE
Colorectal & General Surgery  Operative Report    Patient: Valente Mccall  YOB: 1975  MRN: [id number]  DATE OF PROCEDURE: 08/19/24     PREOPERATIVE DIAGNOSIS:  Colon cancer screening    POSTOPERATIVE DIAGNOSIS:  Diverticulosis  Normal colonic mucosa with no abnormalities    PROCEDURE:  Colonoscopy to cecum     FINDINGS:  Normal colon mucosa with no abnormalities  Diverticulosis in the sigmoid colon    RECOMMENDATIONS:   Repeat colonoscopy in 10 years for colorectal cancer screening purposes  High-fiber diet    SURGEON:  Jose Martin Lozano MD    ANESTHESIA:  Monitored anesthesia care    EBL:  None    SPECIMEN:  None    OPERATIVE DESCRIPTION:  The patient was brought to the endoscopy suite under the care of the nursing staff.  A preoperative timeout was performed.  Monitored anesthesia care was initiated.  A digital rectal examination was performed.  The endoscope was inserted into the anus and advanced carefully to the cecum.  The endoscope was then withdrawn and the entire mucosal surface of the colon and rectum were visualized.  Retroflexion was performed in the rectum.  The scope was then withdrawn.    The patient tolerated the procedure well and was transferred to the postanesthesia care unit in stable condition.    Jose Martin Lozano M.D.  Colorectal & General Surgery  [facility]    [address] [place] [address], [address]  [place], [place], [address]  P: [phone]  F: [phone]

## 2024-08-19 NOTE — ANESTHESIA POSTPROCEDURE EVALUATION
"Patient: Valente Mccall    Procedure Summary       Date: 08/19/24 Room / Location: [facility] ENDOSCOPY 10 / [facility] ENDOSCOPY    Anesthesia Start: 0913 Anesthesia Stop: 0942    Procedure: COLONOSCOPY to cecum Diagnosis:       Colon cancer screening      (Colon cancer screening [Z12.11])    Surgeons: Chavez Lozano MD Provider: Kendall Hampton MD    Anesthesia Type: MAC ASA Status: 3            Anesthesia Type: MAC    Vitals  Vitals Value Taken Time   /85 08/19/24 1001   Temp     Pulse 83 08/19/24 1001   Resp 16 08/19/24 1001   SpO2 97 % 08/19/24 1001           Post Anesthesia Care and Evaluation    Patient location during evaluation: bedside  Patient participation: complete - patient participated  Level of consciousness: sleepy but conscious  Pain score: 0  Pain management: adequate    Airway patency: patent  Anesthetic complications: No anesthetic complications    Cardiovascular status: acceptable  Respiratory status: acceptable  Hydration status: acceptable    Comments: /85 (BP Location: Left arm, Patient Position: Sitting)   Pulse 83   Resp 16   Ht 175.3 cm (69\")   Wt (!) 144 kg (316 lb 11.2 oz)   SpO2 97%   BMI 46.77 kg/m²       "

## 2024-08-19 NOTE — H&P
Colorectal & General Surgery  History and Physical    Patient: Valente Mccall  YOB: 1975  MRN: [id number]      Assessment  Valente Mccall is a 49 y.o. male here for routine colorectal cancer screening.    Plan  Proceed with colonoscopy      History of Present Illness   Valente Mccall is a 49 y.o. male who presents for routine colorectal cancer screening.    Past Medical History   Past Medical History:   Diagnosis Date    Prostatitis 2011    Hospitalized 2 night, IV levaquin        Past Surgical History   Past Surgical History:   Procedure Laterality Date    ADENOIDECTOMY  1982    TONSILLECTOMY  1982       Social History  Social History     Socioeconomic History    Marital status:    Tobacco Use    Smoking status: Never    Smokeless tobacco: Never   Vaping Use    Vaping status: Never Used   Substance and Sexual Activity    Alcohol use: Yes     Alcohol/week: 3.0 standard drinks of alcohol     Types: 3 Cans of beer per week    Drug use: Never    Sexual activity: Yes     Partners: Female     Birth control/protection: Tubal ligation       Family History  Family History   Problem Relation Age of Onset    Multiple sclerosis Mother     Hyperlipidemia Brother     Hypertension Brother     Alcohol abuse Maternal Grandfather     Alcohol abuse Paternal Grandfather     Diabetes type I Cousin     Colon cancer Neg Hx     Breast cancer Neg Hx        Review of Systems  Negative except as documented in the HPI.     Allergies  No Known Allergies    Medications    Current Facility-Administered Medications:     lactated ringers infusion 1,000 mL, 1,000 mL, Intravenous, Continuous, Chavez Lozano MD, Last Rate: 25 mL/hr at 08/19/24 0843, 1,000 mL at 08/19/24 0843    lactated ringers infusion 1,000 mL, 1,000 mL, Intravenous, Continuous, Chavez Lozano MD    Vital Signs  Vitals:    08/19/24 0814   BP: 145/100   Pulse: 96   Resp: 16   SpO2: 97%        Physical Exam  Constitutional: Resting comfortably,  no acute distress  Neck: Supple, trachea midline  Respiratory: No increased work of breathing, Symmetric excursion  Cardiovascular: Well pefursed, no jugular venous distention evident   Abdominal:  Soft, non-tender, non-distended  Lymphatics: No cervical or suprascapular adenopathy  Skin: Warm, dry, no rash on visualized skin surfaces  Musculoskeletal: Symmetric strength, no obvious gross abnormalities  Psychiatric: Alert and oriented ×3, normal affect            Jose Martin Lozano MD  Colorectal & General Surgery  [facility]    [address], [address]  [place], [place], [address]  P: [phone]  F: [phone]

## 2024-08-19 NOTE — ANESTHESIA PREPROCEDURE EVALUATION
Anesthesia Evaluation     Patient summary reviewed and Nursing notes reviewed   NPO Solid Status: > 8 hours  NPO Liquid Status: > 4 hours           Airway   Mallampati: II  Possible difficult intubation and Large neck circumference  Dental      Comment: One cap lower front    Pulmonary     breath sounds clear to auscultation  Cardiovascular     Rhythm: regular        Neuro/Psych  GI/Hepatic/Renal/Endo    (+) obesity, morbid obesity    ROS Comment: 316 lbs, BMI 46,77    Musculoskeletal     Abdominal   (+) obese   Substance History      OB/GYN          Other                    Anesthesia Plan    ASA 3     MAC     (Polm mask)  intravenous induction     Anesthetic plan, risks, benefits, and alternatives have been provided, discussed and informed consent has been obtained with: patient.    CODE STATUS:

## (undated) DEVICE — SENSR O2 OXIMAX FNGR A/ 18IN NONSTR

## (undated) DEVICE — ADAPT CLN BIOGUARD AIR/H2O DISP

## (undated) DEVICE — CANN O2 ETCO2 FITS ALL CONN CO2 SMPL A/ 7IN DISP LF

## (undated) DEVICE — TUBING, SUCTION, 1/4" X 10', STRAIGHT: Brand: MEDLINE

## (undated) DEVICE — KT ORCA ORCAPOD DISP STRL